# Patient Record
Sex: MALE | Race: WHITE | Employment: FULL TIME | URBAN - METROPOLITAN AREA
[De-identification: names, ages, dates, MRNs, and addresses within clinical notes are randomized per-mention and may not be internally consistent; named-entity substitution may affect disease eponyms.]

---

## 2022-12-01 ENCOUNTER — OFFICE VISIT (OUTPATIENT)
Dept: URGENT CARE | Age: 62
End: 2022-12-01
Payer: COMMERCIAL

## 2022-12-01 VITALS
DIASTOLIC BLOOD PRESSURE: 81 MMHG | WEIGHT: 205 LBS | SYSTOLIC BLOOD PRESSURE: 132 MMHG | TEMPERATURE: 97.2 F | OXYGEN SATURATION: 98 % | RESPIRATION RATE: 16 BRPM | HEART RATE: 89 BPM

## 2022-12-01 DIAGNOSIS — B96.89 ACUTE BACTERIAL SINUSITIS: Primary | ICD-10-CM

## 2022-12-01 DIAGNOSIS — J01.90 ACUTE BACTERIAL SINUSITIS: Primary | ICD-10-CM

## 2022-12-01 PROCEDURE — 99203 OFFICE O/P NEW LOW 30 MIN: CPT | Performed by: NURSE PRACTITIONER

## 2022-12-01 RX ORDER — LORATADINE 10 MG
TABLET,CHEWABLE ORAL
COMMUNITY

## 2022-12-01 RX ORDER — GLIPIZIDE 10 MG/1
TABLET ORAL
COMMUNITY

## 2022-12-01 RX ORDER — AMOXICILLIN AND CLAVULANATE POTASSIUM 875; 125 MG/1; MG/1
1 TABLET, FILM COATED ORAL 2 TIMES DAILY
Qty: 20 TABLET | Refills: 0 | Status: SHIPPED | OUTPATIENT
Start: 2022-12-01 | End: 2022-12-11

## 2022-12-01 RX ORDER — ASPIRIN 325 MG
TABLET ORAL AS DIRECTED
COMMUNITY

## 2022-12-01 RX ORDER — LOSARTAN POTASSIUM 50 MG/1
1 TABLET ORAL DAILY
COMMUNITY

## 2022-12-01 RX ORDER — METFORMIN HYDROCHLORIDE 500 MG/1
2 TABLET, EXTENDED RELEASE ORAL 2 TIMES DAILY
COMMUNITY

## 2022-12-01 RX ORDER — ATORVASTATIN CALCIUM 40 MG/1
TABLET, FILM COATED ORAL
COMMUNITY

## 2022-12-01 NOTE — LETTER
NOTIFICATION RETURN TO WORK / SCHOOL    12/1/2022 12:25 PM    Mr. Valentino [address] [facility] [id number]      To Whom It May Concern:    Juan Luis Jones is currently [facility] the care [address] Please excuse from work. He will return to work/school on: 12/05/2022    If there are questions or concerns please have the patient contact our office.         Sincerely,      GHE PROVIDER

## 2022-12-01 NOTE — PROGRESS NOTES
Subjective: (As above and below)     The patient/guardian gave verbal consent to treat. Chief Complaint   Patient presents with    Cold Symptoms     Cough, sinus pain, and swollen glands x 3-4 days      Matthew Hart is a 58 y.o. male who presents for evaluation of : sinus infection. Symptom onset 3 weeks ago after blowing leaves. Has had sinus pain, pressure, nasal congestion, post nasal drip. Not resolved with nasal steroid, nasal saline or typical home care. Denies fever, SOB      ROS  Review of Systems - negative except as listed above    Reviewed PmHx, RxHx, FmHx, SocHx, AllgHx and updated in chart. History reviewed. No pertinent family history. Past Medical History:   Diagnosis Date    Diabetes ([facility] [address]     Hypertension       Social History     Socioeconomic History    Marital status:    Tobacco Use    Smoking status: Never    Smokeless tobacco: Never          Current Outpatient Medications   Medication Sig    empagliflozin (Jardiance) 25 mg tablet 1 tablet    loratadine (Claritin) 10 mg chew 1 tablet    multivit with min-folic acid (Adult Multivitamin Gummies) 200 mcg chew as directed. atorvastatin (LIPITOR) 40 mg tablet 1 tablet    glipiZIDE (GLUCOTROL) 10 mg tablet 2 tablets in morning and 1 tablet in the evening    losartan (COZAAR) 50 mg tablet Take 1 Tablet by mouth daily. metFORMIN ER (GLUCOPHAGE XR) 500 mg tablet Take 2 Tablets by mouth two (2) times a day. No current facility-administered medications for this visit. Objective:     Vitals:    12/01/22 1148   BP: 132/81   Pulse: 89   Resp: 16   Temp: 97.2 °F (36.2 °C)   SpO2: 98%   Weight: 205 lb (93 kg)       Physical Exam  General appearance - appears well hydrated and does not appear toxic, no acute distress  Eyes - EOMs intact. Non injected. No scleral icterus   Ears - no external swelling. TMs normal bilat. Nose - edematous nasal turbinates bilat with mucus nasal passage L>R.  Mouth - OP clear without swelling, exudate or lesion. Mucus membranes moist. Uvula midline. Neck/Lymphatics - trachea midline, full AROM, no LAD of neck  Chest - Normal breathing effort no wheeze rales, rhonchi or diminishments bilaterally. Heart - RRR, no murmurs  Skin - no observable rashes or pallor  Neurologic- alert and oriented x 3  Psychiatric- normal mood, behavior and though content. Assessment/ Plan:     1. Acute bacterial sinusitis        Bacterial sinus infection- will treat based on duration without improvement  Antibiotic was sent into your pharmacy please take until completion. Nasal sinus rinse/neti pot 1-2 times per day for next 7-10 days OR Nasal saline sprays (OTC) 2 sprays each nostril 3-4 times per day. Humidified air at night or steam during shower may provide some relief. Follow up: Follow up immediately for any new, worsening or changes or if symptoms are not improving over the next 5-7 days.          Vic Stephenson NP

## 2023-05-21 ENCOUNTER — OFFICE VISIT (OUTPATIENT)
Age: 63
End: 2023-05-21

## 2023-05-21 VITALS
HEIGHT: 70 IN | TEMPERATURE: 97.1 F | DIASTOLIC BLOOD PRESSURE: 77 MMHG | BODY MASS INDEX: 28.67 KG/M2 | SYSTOLIC BLOOD PRESSURE: 131 MMHG | HEART RATE: 86 BPM | WEIGHT: 200.3 LBS | RESPIRATION RATE: 16 BRPM | OXYGEN SATURATION: 98 %

## 2023-05-21 DIAGNOSIS — B34.9 VIRAL ILLNESS: Primary | ICD-10-CM

## 2023-05-21 RX ORDER — ATORVASTATIN CALCIUM 40 MG/1
TABLET, FILM COATED ORAL
COMMUNITY

## 2023-05-21 RX ORDER — LOSARTAN POTASSIUM 50 MG/1
TABLET ORAL
COMMUNITY

## 2023-05-21 RX ORDER — METFORMIN HYDROCHLORIDE 500 MG/1
TABLET, EXTENDED RELEASE ORAL
COMMUNITY

## 2023-05-21 RX ORDER — GABAPENTIN 100 MG/1
CAPSULE ORAL
COMMUNITY
Start: 2023-04-14

## 2023-05-21 RX ORDER — BLOOD-GLUCOSE SENSOR
EACH MISCELLANEOUS
COMMUNITY
Start: 2023-04-21

## 2023-05-21 RX ORDER — GLIPIZIDE 10 MG/1
TABLET ORAL
COMMUNITY

## 2024-02-19 ENCOUNTER — OFFICE VISIT (OUTPATIENT)
Age: 64
End: 2024-02-19

## 2024-02-19 VITALS
SYSTOLIC BLOOD PRESSURE: 117 MMHG | HEART RATE: 95 BPM | OXYGEN SATURATION: 96 % | RESPIRATION RATE: 18 BRPM | TEMPERATURE: 98.8 F | WEIGHT: 195 LBS | BODY MASS INDEX: 27.98 KG/M2 | DIASTOLIC BLOOD PRESSURE: 75 MMHG

## 2024-02-19 DIAGNOSIS — B34.9 VIRAL ILLNESS: Primary | ICD-10-CM

## 2024-02-19 LAB
INFLUENZA A ANTIGEN, POC: NEGATIVE
INFLUENZA B ANTIGEN, POC: NEGATIVE
Lab: NORMAL
PERFORMING INSTRUMENT: NORMAL
QC PASS/FAIL: NORMAL
SARS-COV-2, POC: NORMAL

## 2024-02-19 ASSESSMENT — ENCOUNTER SYMPTOMS: DIARRHEA: 1

## 2024-02-19 NOTE — PROGRESS NOTES
Hansel Aceves (:  1960) is a 63 y.o. male,Established patient, here for evaluation of the following chief complaint(s):  Diarrhea (C/o diarrhea and body aches. Sx 2 days.)      ASSESSMENT/PLAN:  Visit Diagnoses and Associated Orders       Viral illness    -  Primary    POCT Influenza A/B Antigen [96517 Custom]      POCT COVID-19, Antigen [[id number] Custom]                    Likely viral etiology.  Recommend imodium and tylenol prn.  Push clear liquids, fluids, rest.      Follow up with PCP in PRN days if symptoms persist or if symptoms worsen.    SUBJECTIVE/OBJECTIVE:    Diarrhea      63 y.o. male presents with symptoms of myalgias, diarrhea, anorexia for 1 day.  Slight congestion.  Feels feverish but no measured fevers.  Denies cough, N/V.  Taking fluids.  Decreased food intake.  No known sick contacts.  Taking pepto bismol without relief.         Vitals:    24 1542   BP: 117/75   Site: Left Upper Arm   Position: Sitting   Cuff Size: Medium Adult   Pulse: 95   Resp: 18   Temp: 98.8 °F (37.1 °C)   TempSrc: Oral   SpO2: 96%   Weight: 88.5 kg (195 lb)       Results for POC orders placed in visit on 24   POCT Influenza A/B Antigen   Result Value Ref Range    Inflenza A Ag negative     Influenza B Ag negative         Physical Exam  Constitutional:       General: He is not in acute distress.     Appearance: Normal appearance. He is not ill-appearing or toxic-appearing.   HENT:      Head: Normocephalic and atraumatic.      Right Ear: Tympanic membrane, ear canal and external ear normal.      Left Ear: Tympanic membrane, ear canal and external ear normal.      Nose: Nose normal.      Mouth/Throat:      Mouth: Mucous membranes are moist.      Pharynx: Oropharynx is clear.   Eyes:      Extraocular Movements: Extraocular movements intact.      Conjunctiva/sclera: Conjunctivae normal.      Pupils: Pupils are equal, round, and reactive to light.   Cardiovascular:      Rate and Rhythm: Normal rate.

## 2024-04-14 ENCOUNTER — OFFICE VISIT (OUTPATIENT)
Age: 64
End: 2024-04-14

## 2024-04-14 VITALS
DIASTOLIC BLOOD PRESSURE: 80 MMHG | OXYGEN SATURATION: 97 % | RESPIRATION RATE: 18 BRPM | BODY MASS INDEX: 27.63 KG/M2 | HEIGHT: 70 IN | HEART RATE: 73 BPM | SYSTOLIC BLOOD PRESSURE: 131 MMHG | TEMPERATURE: 98.7 F | WEIGHT: 193 LBS

## 2024-04-14 DIAGNOSIS — J45.20 MILD INTERMITTENT ASTHMATIC BRONCHITIS WITHOUT COMPLICATION: Primary | ICD-10-CM

## 2024-04-14 DIAGNOSIS — Z86.19 HISTORY OF THRUSH: ICD-10-CM

## 2024-04-14 RX ORDER — DOXYCYCLINE HYCLATE 100 MG
100 TABLET ORAL 2 TIMES DAILY
Qty: 14 TABLET | Refills: 0 | Status: SHIPPED | OUTPATIENT
Start: 2024-04-14 | End: 2024-04-21

## 2024-04-14 RX ORDER — PREDNISONE 20 MG/1
40 TABLET ORAL DAILY
Qty: 10 TABLET | Refills: 0 | Status: SHIPPED | OUTPATIENT
Start: 2024-04-14 | End: 2024-04-19

## 2024-04-14 RX ORDER — CLOTRIMAZOLE 10 MG/1
10 LOZENGE ORAL; TOPICAL
Qty: 50 TABLET | Refills: 0 | Status: SHIPPED | OUTPATIENT
Start: 2024-04-14 | End: 2024-04-24

## 2024-04-14 RX ORDER — BENZONATATE 200 MG/1
200 CAPSULE ORAL 3 TIMES DAILY PRN
Qty: 21 CAPSULE | Refills: 0 | Status: SHIPPED | OUTPATIENT
Start: 2024-04-14 | End: 2024-04-21

## 2024-04-14 RX ORDER — ALBUTEROL SULFATE 90 UG/1
2 AEROSOL, METERED RESPIRATORY (INHALATION) EVERY 4 HOURS PRN
Qty: 18 G | Refills: 0 | Status: SHIPPED | OUTPATIENT
Start: 2024-04-14